# Patient Record
Sex: FEMALE | Race: WHITE | NOT HISPANIC OR LATINO | URBAN - NONMETROPOLITAN AREA
[De-identification: names, ages, dates, MRNs, and addresses within clinical notes are randomized per-mention and may not be internally consistent; named-entity substitution may affect disease eponyms.]

---

## 2018-02-15 PROBLEM — K31.89 OTHER DISEASES OF STOMACH AND DUODENUM: Status: ACTIVE | Noted: 2018-02-15

## 2018-02-15 PROBLEM — K22.2 ESOPHAGEAL OBSTRUCTION: Status: ACTIVE | Noted: 2018-02-15

## 2018-02-15 PROBLEM — K29.70 GASTRITIS, UNSPECIFIED, WITHOUT BLEEDING: Status: ACTIVE | Noted: 2018-02-15

## 2020-02-24 ENCOUNTER — OFFICE (OUTPATIENT)
Dept: URBAN - NONMETROPOLITAN AREA CLINIC 15 | Facility: CLINIC | Age: 75
End: 2020-02-24
Payer: COMMERCIAL

## 2020-02-24 VITALS
SYSTOLIC BLOOD PRESSURE: 105 MMHG | HEIGHT: 66 IN | RESPIRATION RATE: 17 BRPM | HEART RATE: 75 BPM | DIASTOLIC BLOOD PRESSURE: 54 MMHG | WEIGHT: 155 LBS

## 2020-02-24 DIAGNOSIS — K44.9 DIAPHRAGMATIC HERNIA WITHOUT OBSTRUCTION OR GANGRENE: ICD-10-CM

## 2020-02-24 DIAGNOSIS — R10.32 LEFT LOWER QUADRANT PAIN: ICD-10-CM

## 2020-02-24 DIAGNOSIS — Z86.010 PERSONAL HISTORY OF COLONIC POLYPS: ICD-10-CM

## 2020-02-24 DIAGNOSIS — R19.7 DIARRHEA, UNSPECIFIED: ICD-10-CM

## 2020-02-24 DIAGNOSIS — R13.10 DYSPHAGIA, UNSPECIFIED: ICD-10-CM

## 2020-02-24 PROBLEM — R12 HEARTBURN: Status: ACTIVE | Noted: 2018-02-15

## 2020-02-24 PROCEDURE — 99214 OFFICE O/P EST MOD 30 MIN: CPT

## 2020-03-02 ENCOUNTER — ON CAMPUS - OUTPATIENT (OUTPATIENT)
Dept: URBAN - NONMETROPOLITAN AREA HOSPITAL 24 | Facility: HOSPITAL | Age: 75
End: 2020-03-02
Payer: COMMERCIAL

## 2020-03-02 DIAGNOSIS — K64.0 FIRST DEGREE HEMORRHOIDS: ICD-10-CM

## 2020-03-02 DIAGNOSIS — Z86.010 PERSONAL HISTORY OF COLONIC POLYPS: ICD-10-CM

## 2020-03-02 DIAGNOSIS — R12 HEARTBURN: ICD-10-CM

## 2020-03-02 DIAGNOSIS — K57.30 DIVERTICULOSIS OF LARGE INTESTINE WITHOUT PERFORATION OR ABS: ICD-10-CM

## 2020-03-02 DIAGNOSIS — R13.10 DYSPHAGIA, UNSPECIFIED: ICD-10-CM

## 2020-03-02 DIAGNOSIS — K63.5 POLYP OF COLON: ICD-10-CM

## 2020-03-02 DIAGNOSIS — K29.50 UNSPECIFIED CHRONIC GASTRITIS WITHOUT BLEEDING: ICD-10-CM

## 2020-03-02 PROCEDURE — 43248 EGD GUIDE WIRE INSERTION: CPT | Performed by: INTERNAL MEDICINE

## 2020-03-02 PROCEDURE — 43239 EGD BIOPSY SINGLE/MULTIPLE: CPT | Mod: 59 | Performed by: INTERNAL MEDICINE

## 2020-03-02 PROCEDURE — 45385 COLONOSCOPY W/LESION REMOVAL: CPT | Performed by: INTERNAL MEDICINE

## 2022-02-01 ENCOUNTER — OFFICE (OUTPATIENT)
Dept: URBAN - NONMETROPOLITAN AREA CLINIC 9 | Facility: CLINIC | Age: 77
End: 2022-02-01
Payer: COMMERCIAL

## 2022-02-01 VITALS — HEIGHT: 66 IN

## 2022-02-01 DIAGNOSIS — K21.00 GASTRO-ESOPHAGEAL REFLUX DISEASE WITH ESOPHAGITIS, WITHOUT B: ICD-10-CM

## 2022-02-01 DIAGNOSIS — R19.7 DIARRHEA, UNSPECIFIED: ICD-10-CM

## 2022-02-01 DIAGNOSIS — K44.9 DIAPHRAGMATIC HERNIA WITHOUT OBSTRUCTION OR GANGRENE: ICD-10-CM

## 2022-02-01 PROBLEM — K21.0 GASTRO-ESOPHAGEAL REFLUX DISEASE WITH ESOPHAGITIS: Status: ACTIVE | Noted: 2018-02-15

## 2022-02-01 PROCEDURE — 99213 OFFICE O/P EST LOW 20 MIN: CPT | Mod: 95

## 2022-02-01 NOTE — SERVICEHPINOTES
76-year-old  female who is seen today via telehealth for symptoms of diarrhea and concern about an upcoming the replacement.  The patient reports longstanding history of intolerance to certain antibiotics.  She is scheduled to undergo a knee replacement and is prescribed 2 weeks of Bactrim.  She does not believe she has ever had any type of reaction to Bactrim in the past.  She has been under a tremendous amount of stress.  Her  passed away in September and several days later she suffered a fall with a leg.fracture.    She reports intermittent diarrhea.  It usually is postprandial.  She reports that Imodium controls the symptoms.  She has taken Pepto-Bismol at times in the past but states that it is on the list of medications to avoid prior to her knee surgery.  She is willing to take a probiotic.  We discussed VSL 3. her last EGD and colonoscopy were in early 2020. She had EGD with empiric dilation.  She had a hyperplastic polyp in her transverse colon.  Biopsies of her antrum showed no evidence of H pylori.  She had no evidence of microscopic or collagenous colitis at that time.[ROS Wording]

## 2022-02-01 NOTE — SERVICENOTES
This visit was completed via  Zoom due to the restrictions of the COVID-19 pandemic. All issues as below were discussed and addressed but no physical exam was performed unless allowed by visual confirmation on Zoom. If it was felt that the patient should be evaluated in clinic then they were directed there. Patient verbally consented to visit.

Start time:  12:30 p.m.
End time: 12:45 p.m.

## 2023-06-12 ENCOUNTER — OFFICE (OUTPATIENT)
Dept: URBAN - NONMETROPOLITAN AREA CLINIC 5 | Facility: CLINIC | Age: 78
End: 2023-06-12
Payer: COMMERCIAL

## 2023-06-12 VITALS
DIASTOLIC BLOOD PRESSURE: 59 MMHG | HEART RATE: 65 BPM | HEIGHT: 66 IN | WEIGHT: 146 LBS | SYSTOLIC BLOOD PRESSURE: 119 MMHG | RESPIRATION RATE: 18 BRPM

## 2023-06-12 DIAGNOSIS — R13.10 DYSPHAGIA, UNSPECIFIED: ICD-10-CM

## 2023-06-12 DIAGNOSIS — K21.9 GASTRO-ESOPHAGEAL REFLUX DISEASE WITHOUT ESOPHAGITIS: ICD-10-CM

## 2023-06-12 DIAGNOSIS — K58.0 IRRITABLE BOWEL SYNDROME WITH DIARRHEA: ICD-10-CM

## 2023-06-12 PROCEDURE — 99214 OFFICE O/P EST MOD 30 MIN: CPT

## 2025-07-23 ENCOUNTER — OFFICE (OUTPATIENT)
Dept: URBAN - NONMETROPOLITAN AREA CLINIC 5 | Facility: CLINIC | Age: 80
End: 2025-07-23
Payer: MEDICARE

## 2025-07-23 VITALS
DIASTOLIC BLOOD PRESSURE: 73 MMHG | WEIGHT: 150 LBS | RESPIRATION RATE: 16 BRPM | HEART RATE: 67 BPM | HEIGHT: 66 IN | SYSTOLIC BLOOD PRESSURE: 126 MMHG

## 2025-07-23 DIAGNOSIS — R19.7 DIARRHEA, UNSPECIFIED: ICD-10-CM

## 2025-07-23 DIAGNOSIS — R12 HEARTBURN: ICD-10-CM

## 2025-07-23 DIAGNOSIS — R13.10 DYSPHAGIA, UNSPECIFIED: ICD-10-CM

## 2025-07-23 DIAGNOSIS — K21.9 GASTRO-ESOPHAGEAL REFLUX DISEASE WITHOUT ESOPHAGITIS: ICD-10-CM

## 2025-07-23 PROCEDURE — 99214 OFFICE O/P EST MOD 30 MIN: CPT | Performed by: NURSE PRACTITIONER

## 2025-07-23 RX ORDER — OMEPRAZOLE 40 MG/1
40 CAPSULE, DELAYED RELEASE ORAL
Qty: 30 | Refills: 11 | Status: ACTIVE
Start: 2025-07-23

## 2025-07-23 RX ORDER — FAMOTIDINE 40 MG/1
40 TABLET, FILM COATED ORAL
Qty: 30 | Refills: 11 | Status: ACTIVE
Start: 2025-07-23

## 2025-07-23 NOTE — SERVICEHPINOTES
Fadumo Sidhu   is a   79   year old  female   who is here today for routine follow up.  she has a PMH of colon polyps, GERD and chronic diarrhea she reports since she was a young child.  She also has noncompliance to medications.  Her last EGD  and colonoscopy on 03/02/2024 for heartburn, left lower quadrant abdominal pain and diarrhea revealed gastritis with biopsies negative for H. pylori, empiric esophageal dilation 18 mm Savary dilator, mixed hemorrhoid, hyperplastic colon polyp and diverticulosis.  She was instructed to continue her Dexilant at time.
supriya epps   Comes in today with complaints of GERD and chronic diarrhea.  She has no longer taking any acid suppression medication stating "it never helped".  After asking her if she took this prescribed on a daily basis she admits that she did not take daily.  She currently takes Tums and Elizabeth-Cunningham when symptomatic.   She has some abdominal cramping and loose stool at least 3 to 4 times a week and on a bad day she will have up to 4 bowel movements a day.  Symptoms typically exacerbated by eating.  Denies nocturnal symptoms.  Denies melena or hematochezia.